# Patient Record
Sex: FEMALE | Race: WHITE | URBAN - METROPOLITAN AREA
[De-identification: names, ages, dates, MRNs, and addresses within clinical notes are randomized per-mention and may not be internally consistent; named-entity substitution may affect disease eponyms.]

---

## 2018-02-21 ENCOUNTER — PRENATAL OFFICE VISIT (OUTPATIENT)
Dept: OBGYN | Facility: CLINIC | Age: 23
End: 2018-02-21
Payer: MEDICAID

## 2018-02-21 ENCOUNTER — CARE COORDINATION (OUTPATIENT)
Dept: CARE COORDINATION | Facility: CLINIC | Age: 23
End: 2018-02-21

## 2018-02-21 VITALS
TEMPERATURE: 98.1 F | WEIGHT: 113.4 LBS | SYSTOLIC BLOOD PRESSURE: 121 MMHG | BODY MASS INDEX: 20.09 KG/M2 | DIASTOLIC BLOOD PRESSURE: 64 MMHG | HEIGHT: 63 IN | HEART RATE: 109 BPM

## 2018-02-21 DIAGNOSIS — Z34.80 PRENATAL CARE, SUBSEQUENT PREGNANCY: Primary | ICD-10-CM

## 2018-02-21 DIAGNOSIS — Z34.81 PRENATAL CARE, SUBSEQUENT PREGNANCY IN FIRST TRIMESTER: Primary | ICD-10-CM

## 2018-02-21 LAB
ABO + RH BLD: NORMAL
ABO + RH BLD: NORMAL
ALBUMIN UR-MCNC: NEGATIVE MG/DL
APPEARANCE UR: CLEAR
BILIRUB UR QL STRIP: NEGATIVE
BLD GP AB SCN SERPL QL: NORMAL
BLOOD BANK CMNT PATIENT-IMP: NORMAL
COLOR UR AUTO: YELLOW
ERYTHROCYTE [DISTWIDTH] IN BLOOD BY AUTOMATED COUNT: 13.6 % (ref 10–15)
GLUCOSE UR STRIP-MCNC: NEGATIVE MG/DL
HCT VFR BLD AUTO: 37.9 % (ref 35–47)
HGB BLD-MCNC: 12.7 G/DL (ref 11.7–15.7)
HGB UR QL STRIP: NEGATIVE
KETONES UR STRIP-MCNC: NEGATIVE MG/DL
LEUKOCYTE ESTERASE UR QL STRIP: NEGATIVE
MCH RBC QN AUTO: 31.1 PG (ref 26.5–33)
MCHC RBC AUTO-ENTMCNC: 33.5 G/DL (ref 31.5–36.5)
MCV RBC AUTO: 93 FL (ref 78–100)
NITRATE UR QL: NEGATIVE
PH UR STRIP: 6 PH (ref 5–7)
PLATELET # BLD AUTO: 318 10E9/L (ref 150–450)
RBC # BLD AUTO: 4.08 10E12/L (ref 3.8–5.2)
SOURCE: NORMAL
SP GR UR STRIP: 1.02 (ref 1–1.03)
SPECIMEN EXP DATE BLD: NORMAL
UROBILINOGEN UR STRIP-ACNC: 0.2 EU/DL (ref 0.2–1)
WBC # BLD AUTO: 11.4 10E9/L (ref 4–11)

## 2018-02-21 PROCEDURE — 85027 COMPLETE CBC AUTOMATED: CPT | Performed by: OBSTETRICS & GYNECOLOGY

## 2018-02-21 PROCEDURE — 80307 DRUG TEST PRSMV CHEM ANLYZR: CPT | Performed by: OBSTETRICS & GYNECOLOGY

## 2018-02-21 PROCEDURE — 99207 ZZC NO CHARGE NURSE ONLY: CPT | Performed by: OBSTETRICS & GYNECOLOGY

## 2018-02-21 PROCEDURE — 81003 URINALYSIS AUTO W/O SCOPE: CPT | Performed by: OBSTETRICS & GYNECOLOGY

## 2018-02-21 PROCEDURE — 99207 ZZC FIRST OB VISIT: CPT | Performed by: OBSTETRICS & GYNECOLOGY

## 2018-02-21 PROCEDURE — 86762 RUBELLA ANTIBODY: CPT | Performed by: OBSTETRICS & GYNECOLOGY

## 2018-02-21 PROCEDURE — 86901 BLOOD TYPING SEROLOGIC RH(D): CPT | Performed by: OBSTETRICS & GYNECOLOGY

## 2018-02-21 PROCEDURE — 86900 BLOOD TYPING SEROLOGIC ABO: CPT | Performed by: OBSTETRICS & GYNECOLOGY

## 2018-02-21 PROCEDURE — 87591 N.GONORRHOEAE DNA AMP PROB: CPT | Performed by: OBSTETRICS & GYNECOLOGY

## 2018-02-21 PROCEDURE — 86850 RBC ANTIBODY SCREEN: CPT | Performed by: OBSTETRICS & GYNECOLOGY

## 2018-02-21 PROCEDURE — 87491 CHLMYD TRACH DNA AMP PROBE: CPT | Performed by: OBSTETRICS & GYNECOLOGY

## 2018-02-21 PROCEDURE — 86780 TREPONEMA PALLIDUM: CPT | Performed by: OBSTETRICS & GYNECOLOGY

## 2018-02-21 PROCEDURE — G0145 SCR C/V CYTO,THINLAYER,RESCR: HCPCS | Performed by: OBSTETRICS & GYNECOLOGY

## 2018-02-21 PROCEDURE — 87389 HIV-1 AG W/HIV-1&-2 AB AG IA: CPT | Performed by: OBSTETRICS & GYNECOLOGY

## 2018-02-21 PROCEDURE — 36415 COLL VENOUS BLD VENIPUNCTURE: CPT | Performed by: OBSTETRICS & GYNECOLOGY

## 2018-02-21 PROCEDURE — 87340 HEPATITIS B SURFACE AG IA: CPT | Performed by: OBSTETRICS & GYNECOLOGY

## 2018-02-21 PROCEDURE — 87086 URINE CULTURE/COLONY COUNT: CPT | Performed by: OBSTETRICS & GYNECOLOGY

## 2018-02-21 RX ORDER — LORAZEPAM 0.5 MG/1
0.5 TABLET ORAL EVERY 6 HOURS PRN
COMMUNITY

## 2018-02-21 NOTE — PROGRESS NOTES
Marga Enriquez  is a 23 year old  year old single  G 6 P 2032who presents to the clinic for an new ob visit.    Estimated Date of Delivery: Sep 17, 2018  is calculated from Patient's last menstrual period was Patient's last menstrual period was 12/11/2017.   She has had some spotting - light Carlos was the last- after IC.   She has not had nausea. Weigh loss has not occurred.   This was not a planned pregnancy.   FOB is involved,     OTHER CONCERNS: homeless, living in her car  INFECTION HISTORY  HIV: no  Hepatitis B: no  Hepatitis C: no  Syphilis:  no  Tuberculosis: no   PPD- no  Herpes self: no  Herpes partner:  no  Chlamydia:  no  Gonorrhea:  no  HPV: no  BV:  no  Trichomonis:  no  Chicken Pox:  YES  ====================================================  PERSONAL/SOCIAL HISTORY  Lives alone.  Employment: Unemployed.  Living out of her car.  The FOB is in senior care awaiting a [facility] sentence  .  Additional items: her other 2 children , a girl and a boy live in [place] with their father  =====================================================   REVIEW OF SYSTEMS  CONSTITUTIONAL: NEGATIVE for fever, chills  INTEGUMENTARY/SKIN: NEGATIVE for worrisome rashes, moles or lesions  EYES: NEGATIVE for vision changes   ENT/MOUTH: NEGATIVE for ear, mouth and throat problems  RESP: NEGATIVE for significant cough or SOB  BREAST: NEGATIVE for masses, tenderness or discharge  CV: NEGATIVE for chest pain, palpitations   GI: NEGATIVE for nausea, abdominal pain, heartburn, or change in bowel habits  : NEGATIVE for frequency, dysuria, or hematuria  MUSCULOSKELETAL: NEGATIVE for significant arthralgias or myalgia  NEURO: NEGATIVE for weakness, dizziness or paresthesias or headache  ENDOCRINE: NEGATIVE for temperature intolerance, skin/hair changes  HEME: NEGATIVE for bleeding problems  PSYCHIATRIC: NEGATIVE for changes in mood or affect  C: NEGATIVE for fever, chills  E: NEGATIVE for vision changes   R: NEGATIVE for significant cough or  "SOB  M: NEGATIVE for significant arthralgias or myalgia  N: NEGATIVE for weakness, dizziness or paresthesias or headache  ====================================================  PHYSICAL EXAM: Vitals: /64  Pulse 109  Temp 98.1  F (36.7  C)  Ht 5' 3\" (1.6 m)  Wt 113 lb 6.4 oz (51.4 kg)  LMP 12/11/2017  BMI 20.09 kg/m2  BMI= Body mass index is 20.09 kg/(m^2).      RECOMMENDED WEIGHT GAIN: < 15 lbs.  GENERAL:  Pleasant pregnant female, alert, well groomed.  SKIN:  Warm and dry, without lesions or rashes  HEAD: Symmetrical features.  EYES:  PERRLA,   MOUTH:  Buccal mucosa pink, moist without lesions.    NECK:  Thyroid without enlargement and nodules.  Lymph nodes not palpable.   LUNGS:  Clear to auscultation.  BREAST:  Symmetrical.  No dominant, fixed or suspicious masses are noted.  No skin or nipple changes or axillary nodes.  Self exam is taught and encouraged.  Nipples everted.      HEART:  RRR without murmur.  ABDOMEN: Soft without masses , tenderness or organomegaly.  No CVA tenderness. No scars noted..   MUSCULOSKELETAL:  Full range of motion  EXTREMITIES:  No edema. No significant varicosities.   GENITALIA:  BUS WNL, no lesions noted   VAGINA:  Pink, normal rugae and discharge normal and physiologic,   CERVIX:  smooth, without discharge or CMT and parous os,   firm/ closed 4 cm long.  UTERUS: Anteverted, nontender 10 weeks in size.  ADNEXA: Without masses or tenderness  PELVIS:  Arch; wide . Sacrum; deep. Spines;blunt.  Side walls; straight. Type; gynecoid  PELVIS:   Adequate, Pelvis proven to 5 pounds 14 ounces.  RECTAL:  Normal appearance.  Digital exam deferred.  WET PREP:Not done  gonorrhea  =========================================  ASSESSMENT:  (Z34.81) Prenatal care, subsequent pregnancy in first trimester  (primary encounter diagnosis)  Comment: poor social situation  Estimated Date of Delivery: Sep 17, 2018   Plan: ABO/Rh type and screen, Anti Treponema, CBC         with platelets, " Neisseria gonorrhoeae PCR,         Chlamydia trachomatis PCR, Hepatitis B surface         antigen, HIV Antigen Antibody Combo, Rubella         Antibody IgG Quantitative, *UA reflex to         Microscopic, Urine Culture Aerobic Bacterial,         Drug abuse screen 77 urine (FL, RH, SH), Pap         imaged thin layer screen reflex to HPV if ASCUS        - recommend age 25 - 29        Care coordination referral made      PREGNANCY AT RISK? yes  ==========================================      PLAN:  Discussed physician coverage, tertiary support, diet, exercise, weight gain, schedule of visits, routine and indicated ultrasounds, childbirth education and antepartum testing for certain birth defects.  Encouraged patient to review contents of Prenatal Breastfeeding Education Toolkit. Offered opportunity to answer questions regarding the importance of skin to skin contact, early initiation of exclusive breastfeeding for the first six months and rooming in while in the hospital.  Discussed CDC travel advisory for Zika virus.  Syphilis is a sexually transmitted disease that can cause birth defects in the babies of untreated mothers. Every pregnant patient is tested for syphilis early in each pregnancy as part of the routine lab work. The [place] Department of [facility] has seen an increase in the rate of syphilis in [place]. The [facility] now recommends testing for syphilis 3 times during a pregnancy, the new prenatal visit, 28 weeks and when admitted for delivery    Instructed on use of triage nurse line and contacting the on call Birthplace after hours for an urgent need such as fever, vagina bleeding, bladder or vaginal infection, rupture of membranes,  or term labor.    Discussed the indications, uses for and false positives for quad screen, nuchal translucency and fetal survey ultrasound at 18-20 weeks gestation. Will inform us at the next visit if she wished to avail herself of these screens.  Instructed on best  evidence for: weight gain for her BMI for pregnancy; healthy diet and foods to avoid; exercise and activity during pregnancy;avoiding exposure to toxoplasmosis; and maintenance of a generally healthy lifestyle.   Discussed the harms, benefits, side effects and alternative therapies for current prescribed and OTC medications.

## 2018-02-21 NOTE — LETTER
February 25, 2018      Marga Enriquez  [address]  [place] [address]    Dear Ms.Floydkarissa,      I am happy to inform you that your recent cervical cancer screening test (PAP smear) was normal.      Preventative screenings such as this help to ensure your health for years to come. You should repeat a pap smear in 3 years, unless otherwise directed.      You will still need to return to the clinic every year for your annual exam and other preventive tests.     Please contact the clinic at [phone] if you have further questions.       Sincerely,      Destin Ceja MD/marcelo

## 2018-02-21 NOTE — MR AVS SNAPSHOT
"              After Visit Summary   2018    Marga Enriquez    MRN: [id number]           Patient Information     Date Of Birth          1995        Visit Information        Provider Department      2018 1:15 PM Destin Ceja MD [facility]        Today's Diagnoses     Prenatal care, subsequent pregnancy in first trimester    -  1     labor in second trimester with term delivery, single or unspecified fetus           Follow-ups after your visit        Follow-up notes from your care team     Return in about 4 weeks (around 3/21/2018).      Your next 10 appointments already scheduled     Mar 21, 2018 10:45 AM CDT   ESTABLISHED PRENATAL with Destin Ceja MD   [facility]    [address] [address] [phone]              Who to contact     If you have questions or need follow up information about today's clinic visit or your schedule please contact [facility] directly at [phone].  Normal or non-critical lab and imaging results will be communicated to you by Cloud[employer], letter or phone within 4 business days after the clinic has received the results. If you do not hear from us within 7 days, please contact the clinic through [employer] or phone. If you have a critical or abnormal lab result, we will notify you by phone as soon as possible.  Submit refill requests through [employer] or call your pharmacy and they will forward the refill request to us. Please allow 3 business days for your refill to be completed.          Additional Information About Your Visit        CloudCarhart Information     [employer] lets you send messages to your doctor, view your test results, renew your prescriptions, schedule appointments and more. To sign up, go to [email or web address] . Click on \"Log in\" on the left side of the screen, which will take you to the Welcome page. Then click on \"Sign up Now\" on the right side of " "the page.     You will be asked to enter the access code listed below, as well as some personal information. Please follow the directions to create your username and password.     Your access code is: [email or web address]  Expires: 2018 11:36 AM     Your access code will  in 90 days. If you need help or a new code, please call your [place] clinic or [phone].        Care EveryWhere ID     This is your Care EveryWhere ID. This could be used by other organizations to access your [place] medical records  [id number]        Your Vitals Were     Pulse Temperature Height Last Period BMI (Body Mass Index)       109 98.1  F (36.7  C) 5' 3\" (1.6 m) 2017 20.09 kg/m2        Blood Pressure from Last 3 Encounters:   18 121/64    Weight from Last 3 Encounters:   18 113 lb 6.4 oz (51.4 kg)              We Performed the Following     *UA reflex to Microscopic     ABO/Rh type and screen     Anti Treponema     CBC with platelets     Chlamydia trachomatis PCR     Drug abuse screen 77 urine (FL, RH, SH)     Hepatitis B surface antigen     HIV Antigen Antibody Combo     Neisseria gonorrhoeae PCR     Pap imaged thin layer screen reflex to HPV if ASCUS - recommend age 25 - 29     Rubella Antibody IgG Quantitative     Urine Culture Aerobic Bacterial        Primary Care Provider Fax #    Physician No Ref-Primary [phone]       No address on file        Equal Access to Services     JASON LUCERO : Hadii aad ku hadasho Soomaali, waaxda luqadaha, qaybta kaalmada cristina, ke heaton . So [facility] [phone].    ATENCIÓN: Si habla español, tiene a abdalla disposición servicios gratuitos de asistencia lingüística. Zechariah al [phone].    We comply with applicable federal civil rights laws and [place] laws. We do not discriminate on the basis of race, color, national origin, age, disability, sex, sexual orientation, or gender identity.            Thank you!     Thank you for choosing " [facility]  for your care. Our goal is always to provide you with excellent care. Hearing back from our patients is one way we can continue to improve our services. Please take a few minutes to complete the written survey that you may receive in the mail after your visit with us. Thank you!             Your Updated Medication List - Protect others around you: Learn how to safely use, store and throw away your medicines at www.disposemymeds.org.          This list is accurate as of 2/21/18 11:59 PM.  Always use your most recent med list.                   Brand Name Dispense Instructions for use Diagnosis    LORazepam 0.5 MG tablet    ATIVAN     Take 0.5 mg by mouth every 6 hours as needed for anxiety

## 2018-02-21 NOTE — PROGRESS NOTES
"Clinic Care Coordination Contact 2/21/18  [facility]/[employer]-       Clinical Data: Care Coordinator Outreach to assist the pt with resources and support as she is homeless, and pregnant.  Outreach attempted x 1.  Left message on [employer] with call back information and requested return call.  Plan:  Care Coordinator will try to reach patient again in 1-2 business days.    UPDATE: Pt contacted me from her clinic apt. She states she moved to [place] from [place] to be close to the [place] in [place] as she was having some kidney trouble. She moved in with her aunt who lives in [place]. While she was there, she was looking for housing on [employer]'s list and met a man. She began dating him and is pregnant with his child. He was arrested on a warrant and is now in care home waiting for his [facility] sentence. The pt has two other small children, ages 2 and 4 who live with their father in [place]. Pt states she moved to [facility] to be close to her boyfriend while he's in care home so she is currently living out of her car.     I provided resources to the pt to assist her with obtaining insurance, housing, financial support, etc. I directed her to contacting \"A place for you\" and [employer] which are transitional housing programs. I also provided her with the phone number for [facility] [phone]. I encouraged the pt to reach out to me if she does not find housing and we can continue to search other options.    UPDATE: Phone call made to the pt to see if she had made any calls yet. She states she was at the [facility] family services office filling out paperwork. I encouraged her to speak with someone there to obtain additional housing resources. Pt agrees to call this writer should she need more resources.     Kellie Ivy, [facility]  Care Coordinator Social Work    [facility]  [phone]  2/21/2018 11:11 AM            "

## 2018-02-21 NOTE — NURSING NOTE
"Initial /64  Pulse 109  Temp 98.1  F (36.7  C)  Ht 5' 3\" (1.6 m)  Wt 113 lb 6.4 oz (51.4 kg)  LMP 12/11/2017  BMI 20.09 kg/m2 Estimated body mass index is 20.09 kg/(m^2) as calculated from the following:    Height as of this encounter: 5' 3\" (1.6 m).    Weight as of this encounter: 113 lb 6.4 oz (51.4 kg). .      "

## 2018-02-21 NOTE — MR AVS SNAPSHOT
"              After Visit Summary   2/21/2018    Marga Enriquez    MRN: [id number]           Patient Information     Date Of Birth          1995        Visit Information        Provider Department      2/21/2018 11:11 AM Destin Ceja MD [facility]        Today's Diagnoses     Prenatal care, subsequent pregnancy    -  1       Follow-ups after your visit        Your next 10 appointments already scheduled     Feb 21, 2018  1:15 PM CST   New Prenatal with Destin Ceja MD   [facility] ([facility])    [address] [address]   [phone]              Who to contact     If you have questions or need follow up information about today's clinic visit or your schedule please contact [facility] directly at [phone].  Normal or non-critical lab and imaging results will be communicated to you by Spoolhart, letter or phone within 4 business days after the clinic has received the results. If you do not hear from us within 7 days, please contact the clinic through MyChart or phone. If you have a critical or abnormal lab result, we will notify you by phone as soon as possible.  Submit refill requests through "[employer] or call your pharmacy and they will forward the refill request to us. Please allow 3 business days for your refill to be completed.          Additional Information About Your Visit        MyChart Information     "[employer] lets you send messages to your doctor, view your test results, renew your prescriptions, schedule appointments and more. To sign up, go to [email or web address] . Click on \"Log in\" on the left side of the screen, which will take you to the Welcome page. Then click on \"Sign up Now\" on the right side of the page.     You will be asked to enter the access code listed below, as well as some personal information. Please follow the directions to create your username and password.     Your access code is: " [email or web address]  Expires: 2018 11:36 AM     Your access code will  in 90 days. If you need help or a new code, please call your [place] clinic or [phone].        Care EveryWhere ID     This is your Care EveryWhere ID. This could be used by other organizations to access your [place] medical records  [id number]        Your Vitals Were     Last Period                   2017            Blood Pressure from Last 3 Encounters:   No data found for BP    Weight from Last 3 Encounters:   No data found for Wt              Today, you had the following     No orders found for display       Primary Care Provider    None Specified       No primary provider on file.        Equal Access to Services     [facility]: Hadii ismael Lezama, gayle maldonado, treva evans, ke heaton . So [facility] [phone].    ATENCIÓN: Si habla español, tiene a abdalla disposición servicios gratuitos de asistencia lingüística. Llame al [phone].    We comply with applicable federal civil rights laws and [place] laws. We do not discriminate on the basis of race, color, national origin, age, disability, sex, sexual orientation, or gender identity.            Thank you!     Thank you for choosing [facility]  for your care. Our goal is always to provide you with excellent care. Hearing back from our patients is one way we can continue to improve our services. Please take a few minutes to complete the written survey that you may receive in the mail after your visit with us. Thank you!             Your Updated Medication List - Protect others around you: Learn how to safely use, store and throw away your medicines at www.disposemymeds.org.          This list is accurate as of 18 11:36 AM.  Always use your most recent med list.                   Brand Name Dispense Instructions for use Diagnosis    LORazepam 0.5 MG tablet    ATIVAN     Take 0.5 mg by mouth every 6  hours as needed for anxiety

## 2018-02-22 LAB
AMPHETAMINES UR QL SCN: NEGATIVE
BARBITURATES UR QL: NEGATIVE
BENZODIAZ UR QL: NEGATIVE
C TRACH DNA SPEC QL NAA+PROBE: NEGATIVE
CANNABINOIDS UR QL SCN: NEGATIVE
COCAINE UR QL: NEGATIVE
HBV SURFACE AG SERPL QL IA: NONREACTIVE
HIV 1+2 AB+HIV1 P24 AG SERPL QL IA: NONREACTIVE
N GONORRHOEA DNA SPEC QL NAA+PROBE: NEGATIVE
OPIATES UR QL SCN: NEGATIVE
PCP UR QL SCN: NEGATIVE
RUBV IGG SERPL IA-ACNC: 12 IU/ML
SPECIMEN SOURCE: NORMAL
SPECIMEN SOURCE: NORMAL
T PALLIDUM IGG+IGM SER QL: NEGATIVE

## 2018-02-23 LAB
BACTERIA SPEC CULT: NORMAL
Lab: NORMAL
SPECIMEN SOURCE: NORMAL

## 2018-02-24 LAB
COPATH REPORT: NORMAL
PAP: NORMAL

## 2018-02-28 ENCOUNTER — APPOINTMENT (OUTPATIENT)
Dept: OCCUPATIONAL MEDICINE | Facility: CLINIC | Age: 23
End: 2018-02-28

## 2018-02-28 PROCEDURE — 86580 TB INTRADERMAL TEST: CPT | Performed by: NURSE PRACTITIONER

## 2018-03-14 ENCOUNTER — HOSPITAL ENCOUNTER (OUTPATIENT)
Facility: CLINIC | Age: 23
End: 2018-03-14
Admitting: OBSTETRICS & GYNECOLOGY
Payer: MEDICAID

## 2018-04-03 ENCOUNTER — PRENATAL OFFICE VISIT (OUTPATIENT)
Dept: OBGYN | Facility: CLINIC | Age: 23
End: 2018-04-03
Payer: MEDICAID

## 2018-04-03 VITALS
TEMPERATURE: 96.2 F | RESPIRATION RATE: 16 BRPM | BODY MASS INDEX: 21.62 KG/M2 | SYSTOLIC BLOOD PRESSURE: 125 MMHG | HEIGHT: 63 IN | DIASTOLIC BLOOD PRESSURE: 72 MMHG | WEIGHT: 122 LBS | HEART RATE: 114 BPM

## 2018-04-03 DIAGNOSIS — Z3A.16 16 WEEKS GESTATION OF PREGNANCY: Primary | ICD-10-CM

## 2018-04-03 DIAGNOSIS — F32.89 OTHER DEPRESSION: ICD-10-CM

## 2018-04-03 PROCEDURE — 99207 ZZC PRENATAL VISIT: CPT | Performed by: OBSTETRICS & GYNECOLOGY

## 2018-04-03 NOTE — NURSING NOTE
"Initial /72 (BP Location: Right arm, Patient Position: Chair, Cuff Size: Adult Small)  Pulse 114  Temp 96.2  F (35.7  C) (Tympanic)  Resp 16  Ht 5' 3\" (1.6 m)  Wt 122 lb (55.3 kg)  LMP 12/11/2017  BMI 21.61 kg/m2 Estimated body mass index is 21.61 kg/(m^2) as calculated from the following:    Height as of this encounter: 5' 3\" (1.6 m).    Weight as of this encounter: 122 lb (55.3 kg). .      "

## 2018-04-03 NOTE — PROGRESS NOTES
"CC: Here for routine prenatal visit @ 16w1d   HPI:  Admits to feeling overwhelmed and depressed; no suicidal ideation; previously on Lorazepam and Fluoxetine; agreed to resume fluoxetine and check in after about 2 weeks    PE: /72 (BP Location: Right arm, Patient Position: Chair, Cuff Size: Adult Small)  Pulse 114  Temp 96.2  F (35.7  C) (Tympanic)  Resp 16  Ht 5' 3\" (1.6 m)  Wt 122 lb (55.3 kg)  LMP 12/11/2017  BMI 21.61 kg/m2   See OB flowsheet      A:  1. 16 weeks gestation of pregnancy    - US OB > 14 Weeks Complete Single; Future    2. Other depression    - FLUoxetine (PROZAC) 20 MG capsule; Take 1 capsule (20 mg) by mouth daily  Dispense: 90 capsule; Refill: 3      Routine prenatal care  20 week anomaly screen sonogram  Fluoxetine 20mg po QD  RTC 4 weeks.      Katelynn Newell M.D.     "

## 2018-04-03 NOTE — MR AVS SNAPSHOT
"              After Visit Summary   4/3/2018    Marga Enriquez    MRN: [id number]           Patient Information     Date Of Birth          1995        Visit Information        Provider Department      4/3/2018 1:00 PM Katelynn Newell MD [facility]        Today's Diagnoses     16 weeks gestation of pregnancy    -  1    Other depression           Follow-ups after your visit        Future tests that were ordered for you today     Open Future Orders        Priority Expected Expires Ordered    US OB > 14 Weeks Complete Single Routine  4/3/2019 4/3/2018            Who to contact     If you have questions or need follow up information about today's clinic visit or your schedule please contact [facility] directly at [phone].  Normal or non-critical lab and imaging results will be communicated to you by MyChart, letter or phone within 4 business days after the clinic has received the results. If you do not hear from us within 7 days, please contact the clinic through [employer] or phone. If you have a critical or abnormal lab result, we will notify you by phone as soon as possible.  Submit refill requests through [employer] or call your pharmacy and they will forward the refill request to us. Please allow 3 business days for your refill to be completed.          Additional Information About Your Visit        MyChart Information     [employer] lets you send messages to your doctor, view your test results, renew your prescriptions, schedule appointments and more. To sign up, go to [email or web address] . Click on \"Log in\" on the left side of the screen, which will take you to the Welcome page. Then click on \"Sign up Now\" on the right side of the page.     You will be asked to enter the access code listed below, as well as some personal information. Please follow the directions to create your username and password.     Your access code is: [email or web address]  Expires: 5/22/2018 12:36 PM   " "  Your access code will  in 90 days. If you need help or a new code, please call your [place] [facility] clinic or [phone].        Care EveryWhere ID     This is your Care EveryWhere ID. This could be used by other organizations to access your [facility] medical records  [id number]        Your Vitals Were     Pulse Temperature Respirations Height Last Period BMI (Body Mass Index)    114 96.2  F (35.7  C) (Tympanic) 16 5' 3\" (1.6 m) 2017 21.61 kg/m2       Blood Pressure from Last 3 Encounters:   18 125/72   18 121/64    Weight from Last 3 Encounters:   18 122 lb (55.3 kg)   18 113 lb 6.4 oz (51.4 kg)                 Today's Medication Changes          These changes are accurate as of 4/3/18  1:27 PM.  If you have any questions, ask your nurse or doctor.               Start taking these medicines.        Dose/Directions    FLUoxetine 20 MG capsule   Commonly known as:  PROzac   Used for:  Other depression   Started by:  Katelynn Newell MD        Dose:  20 mg   Take 1 capsule (20 mg) by mouth daily   Quantity:  90 capsule   Refills:  3            Where to get your medicines      These medications were sent to [facility] [address] [place] [address]     Phone:  [phone]     FLUoxetine 20 MG capsule                Primary Care Provider Fax #    Physician No Ref-Primary [phone]       No address on file        Equal Access to Services     JASON LUCERO AH: Hadii ismael corbino Sotiffanyali, waaxda luqadaha, qaybta kaalmada brightegqi, ke mccauley. So [facility] [phone].    ATENCIÓN: Si habla manuelaañol, tiene a abdalla disposición servicios gratuitos de asistencia lingüística. Llame al [phone].    We comply with applicable federal civil rights laws and [place] laws. We do not discriminate on the basis of race, color, national origin, age, disability, sex, sexual orientation, or gender identity.       "      Thank you!     Thank you for choosing [facility]  for your care. Our goal is always to provide you with excellent care. Hearing back from our patients is one way we can continue to improve our services. Please take a few minutes to complete the written survey that you may receive in the mail after your visit with us. Thank you!             Your Updated Medication List - Protect others around you: Learn how to safely use, store and throw away your medicines at www.disposemymeds.org.          This list is accurate as of 4/3/18  1:27 PM.  Always use your most recent med list.                   Brand Name Dispense Instructions for use Diagnosis    FLUoxetine 20 MG capsule    PROzac    90 capsule    Take 1 capsule (20 mg) by mouth daily    Other depression       LORazepam 0.5 MG tablet    ATIVAN     Take 0.5 mg by mouth every 6 hours as needed for anxiety

## 2018-04-05 ENCOUNTER — HOSPITAL ENCOUNTER (EMERGENCY)
Facility: CLINIC | Age: 23
Discharge: HOME | End: 2018-04-05
Attending: FAMILY MEDICINE | Admitting: FAMILY MEDICINE
Payer: MEDICAID

## 2018-04-05 VITALS
OXYGEN SATURATION: 100 % | RESPIRATION RATE: 16 BRPM | DIASTOLIC BLOOD PRESSURE: 60 MMHG | HEART RATE: 107 BPM | SYSTOLIC BLOOD PRESSURE: 110 MMHG | WEIGHT: 122 LBS | BODY MASS INDEX: 21.61 KG/M2 | TEMPERATURE: 96.4 F

## 2018-04-05 DIAGNOSIS — N94.9 ROUND LIGAMENT PAIN: ICD-10-CM

## 2018-04-05 PROCEDURE — 99283 EMERGENCY DEPT VISIT LOW MDM: CPT | Mod: Z6 | Performed by: FAMILY MEDICINE

## 2018-04-05 PROCEDURE — 99283 EMERGENCY DEPT VISIT LOW MDM: CPT

## 2018-04-05 NOTE — ED AVS SNAPSHOT
[facility] Emergency Department    [address] [address]    Phone:  [phone]    Fax:  [phone]                                       Marga Enriquez   MRN: [id number]    Department:  [facility] Emergency Department   Date of Visit:  4/5/2018           Patient Information     Date Of Birth          1995        Your diagnoses for this visit were:     Round ligament pain        You were seen by Elvin Marr MD.        Discharge Instructions       Return to the Emergency Room if the following occurs:     Worsened pain, fever >101, severe vaginal bleeding, fainting, or for any concern at anytime.    Or, follow-up with the following provider as we discussed:     Return to your primary doctor as needed, or if not improved over the next 5 days.    Medications discussed:    Tylenol for comfort, as needed.    If you received pain-relieving or sedating medication during your time in the ER, avoid alcohol, driving automobiles, or working with machinery.  Also, a responsible adult must stay with you.        Call the Nurse Advice Line at [phone] or [phone] for any concern at anytime.      Your next 10 appointments already scheduled     May 02, 2018 10:00 AM CDT   (Arrive by 9:45 AM)   US OB > 14 WEEKS COMPLETE SINGLE with WYUS1   [facility] Ultrasound ([facility])    [address] [address] [phone]           Please bring a list of your medicines (including vitamins, minerals and over-the-counter drugs). Also, tell your doctor about any allergies you may have. Wear comfortable clothes and leave your valuables at home.  If you re less than 20 weeks drink four 8-ounce glasses of fluid an hour before your exam. If you need to empty your bladder before your exam, try to release only a little urine. Then, drink another glass of fluid.  You may have up to two family members in the exam room. If you bring a small child, an adult  must be there to care for him or her.  Please call the Imaging Department at your exam site with any questions.              24 Hour Appointment Hotline       To make an appointment at any [facility], call [phone] ([phone]). If you don't have a family doctor or clinic, we will help you find one. [facility] clinics are conveniently located to serve the needs of you and your family.             Review of your medicines      Our records show that you are taking the medicines listed below. If these are incorrect, please call your family doctor or clinic.        Dose / Directions Last dose taken    FLUoxetine 20 MG capsule   Commonly known as:  PROzac   Dose:  20 mg   Quantity:  90 capsule        Take 1 capsule (20 mg) by mouth daily   Refills:  3        LORazepam 0.5 MG tablet   Commonly known as:  ATIVAN   Dose:  0.5 mg        Take 0.5 mg by mouth every 6 hours as needed for anxiety   Refills:  0                Procedures and tests performed during your visit     POC US OB TRANSABDOMINAL LIMITED      Orders Needing Specimen Collection     None      Pending Results     No orders found from 4/3/2018 to 4/6/2018.            Pending Culture Results     No orders found from 4/3/2018 to 4/6/2018.            Pending Results Instructions     If you had any lab results that were not finalized at the time of your Discharge, you can call the ED Lab Result RN at [phone]. You will be contacted by this team for any positive Lab results or changes in treatment. The nurses are available 7 days a week from 10A to 6:30P.  You can leave a message 24 hours per day and they will return your call.        Test Results From Your Hospital Stay        4/5/2018  8:10 PM      Impression     Point-of-care ultrasound performed by me.  Trans-abdominal ultrasound using the low-frequency probe.  Baby is easily identified within the uterus.  There is appropriate movement.  A heart rate within normal range is identified.  Uterus is  "grossly intact and without obvious pathology.  No obvious evidence for hematoma.  Overall normal appearing OB transabdominal ultrasound.                Thank you for choosing [facility]       Thank you for choosing [facility] for your care. Our goal is always to provide you with excellent care. Hearing back from our patients is one way we can continue to improve our services. Please take a few minutes to complete the written survey that you may receive in the mail after you visit with us. Thank you!        [employer] Information     [employer] lets you send messages to your doctor, view your test results, renew your prescriptions, schedule appointments and more. To sign up, go to [email or web address] . Click on \"Log in\" on the left side of the screen, which will take you to the Welcome page. Then click on \"Sign up Now\" on the right side of the page.     You will be asked to enter the access code listed below, as well as some personal information. Please follow the directions to create your username and password.     Your access code is: [email or web address]  Expires: 2018 12:36 PM     Your access code will  in 90 days. If you need help or a new code, please call your [facility] clinic or [phone].        Care EveryWhere ID     This is your Care EveryWhere ID. This could be used by other organizations to access your [facility] medical records  [id number]        Equal Access to Services     JASON LUCERO : Hadii ismael yoon hadpradipo Sotiffanyali, waaxda luqadaha, qaybta kaalmada cristina, ke heaton . So [facility] [phone].    ATENCIÓN: Si habla español, tiene a abdalla disposición servicios gratuitos de asistencia lingüística. Llame al [phone].    We comply with applicable federal civil rights laws and [place] laws. We do not discriminate on the basis of race, color, national origin, age, disability, sex, sexual orientation, or gender identity.            After Visit Summary       This is your " record. Keep this with you and show to your community pharmacist(s) and doctor(s) at your next visit.

## 2018-04-05 NOTE — ED AVS SNAPSHOT
[facility] Emergency Department    [address] [address]    Phone:  [phone]    Fax:  [phone]                                       Marga Enriquez   MRN: [id number]    Department:  [facility] Emergency Department   Date of Visit:  4/5/2018           After Visit Summary Signature Page     I have received my discharge instructions, and my questions have been answered. I have discussed any challenges I see with this plan with the nurse or doctor.    ..........................................................................................................................................  Patient/Patient Representative Signature      ..........................................................................................................................................  Patient Representative Print Name and Relationship to Patient    ..................................................               ................................................  Date                                            Time    ..........................................................................................................................................  Reviewed by Signature/Title    ...................................................              ..............................................  Date                                                            Time

## 2018-04-05 NOTE — LETTER
April 5, 2018      To Whom It May Concern:      Marga Enriquez was seen in our Emergency Department today, 04/05/18.  I expect her condition to improve over the next 5 days.  She may return to work/school tomorrow but I would recommend she not lift, push, pull anything greater than 30 pounds over the next 5 days.    Sincerely,        Elvin Marr MD

## 2018-04-06 ENCOUNTER — PATIENT OUTREACH (OUTPATIENT)
Dept: CARE COORDINATION | Facility: CLINIC | Age: 23
End: 2018-04-06

## 2018-04-06 NOTE — LETTER
Health Care Home - Access Care Plan    About Me  Patient Name:  Marga Enriquez    YOB: 1995  Age:                             23 year old   Khadijah MRN:            [id number] Telephone Information:     Home Phone [phone]   Mobile [phone]       Address:    [address] [facility] [address] [address] Email address:  No e-mail address on record      Emergency Contact(s)  Name Relationship Lgl Grd Work Phone Home Phone Mobile Phone   1. NONE Other   [phone]              Health Maintenance:      My Access Plan  Medical Emergency 911   Questions or concerns during clinic hours Primary Clinic Line, I will call the clinic directly: [facility] - [phone]   24 Hour Appointment Line [phone] or  [phone]) (toll free)   24 Hour Nurse Line [phone] (toll free)   Questions or concerns outside clinic hours 24 Hour Appointment Line, I will call the after-hours on-call line:   [facility] [phone] or [phone]) (toll-free)   Preferred Urgent Care [facility], [phone]   Preferred Hospital [place] [place]  [phone]   Preferred Pharmacy [facility] - [address]     Behavioral Health Crisis Line The National Suicide Prevention Lifeline at 1-132.752.3241 or 911     My Care Team Members  Patient Care Team       Relationship Specialty Notifications Start End    No Ref-Primary, Physician PCP - General   2/21/18     Fax: [phone]         Pati العراقي LSW Clinic Care Coordinator Primary Care - CC Admissions 2/26/18     Phone: [phone] Fax: [phone]        Roberto Vance Therapist   3/21/18     Comment:  [facility]    Phone: [phone] Fax: [phone]               My Medical and Care Information  Problem List   Patient Active Problem List   Diagnosis     Prenatal care, subsequent pregnancy     Other depression       Current Medications and Allergies:  See printed Medication Report

## 2018-04-06 NOTE — LETTER
[facility] CARE COORDINATION  [address] [place] [address]  [phone]      April 6, 2018      Marga Enriquez  [address] [address]      Dear Marga,    I am a clinic care coordinator who works with the [facility] and I wanted to introduce myself and provide you with my contact information so that you can call me with questions or concerns about your health care. Below is a description of clinic care coordination and how I can further assist you.     The clinic care coordinator is a registered nurse and/or  who understand the health care system. The goal of clinic care coordination is to help you manage your health and improve access to the [facility] system in the most efficient manner. The registered nurse can assist you in meeting your health care goals by providing education, coordinating services, and strengthening the communication among your providers. The  can assist you with financial, behavioral, psychosocial, chemical dependency, counseling, and/or psychiatric resources.    Please feel free to contact me at [phone], with any questions or concerns. We at [facility] are focused on providing you with the highest-quality healthcare experience possible and that all starts with you.     Sincerely,     Pati العراقي    Enclosed: I have enclosed a copy of a 24 Hour Access Plan. This has helpful phone numbers for you to call when needed. Please keep this in an easy to access place to use as needed.

## 2018-04-06 NOTE — ED PROVIDER NOTES
HPI  Patient is a 23-year-old female presenting with abdominal pain.  She is known to be approximately 16 weeks gestation by first trimester ultrasound.  She is seen by OB here at [facility].  Distant history of methamphetamine abuse.  More recent history of miscarriage in the fall 2017 and then in the spring 2017.    The patient was working yesterday as usual.  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  She also describes shoveling last evening.  Immediately after doing this she began to feel some sharp/cramping pain in her lower abdomen, midline.  The pain lessened over the course of the evening but then returned early this morning at about 4:00 AM.  She describes the pain as sharp once again.  She does have some left-sided pelvic discomfort.  It is worse with movement and standing.  Today she was working again and had to help a client get up.  As she did so she had worsened pain.  There is been no nausea or vomiting.  No diarrhea or constipation.  No hematochezia or melena.  No dysuria, urgency, or frequency.  No hematuria.  No vaginal discharge or irritation.  No vaginal bleeding.  No chest pain or shortness of breath.  No skin rash.    ROS: All other review of systems are negative other than that noted above.     Past Medical History:   Diagnosis Date     Anxiety      Chickenpox      Depression      H/O urinary tract infection      Kidney infection     x2- 3/2017- 11/2017  states did get septic with both infections     Panic attacks      Postpartum depression     2014     History reviewed. No pertinent surgical history.  Family History   Problem Relation Age of Onset     Depression Father      Bipolar Disorder Father      No Known Problems Paternal Grandmother      Social History   Substance Use Topics     Smoking status: Current Some Day Smoker     Smokeless tobacco: Never Used      Comment: 2-3 cig/day with pregnancy     Alcohol use No         PHYSICAL  /60  Pulse 107   Temp 96.4  F (35.8  C) (Temporal)  Resp 16  Wt 55.3 kg (122 lb)  LMP 12/11/2017  SpO2 100%  BMI 21.61 kg/m2  General: Patient is alert and in mild to moderate distress.  Discern/anxious.  Neurological: Alert.  Moving upper and lower extremities equally, bilaterally.  Head / Neck: Atraumatic.  Ears: Not done.  Eyes: Pupils are equal, round, and reactive.  Normal conjunctiva.  Nose: Midline.  No epistaxis.  Mouth / Throat: No ulcerations or lesions.  Upper pharynx is not erythematous.  Moist.  Respiratory: No respiratory distress. CTA B.  Cardiovascular: Regular rhythm.  Peripheral extremities are warm.  No edema.  No calf tenderness.  Abdomen / Pelvis: Very minimal tenderness as I push in the pelvis and left lower quadrant.  Gravid.  Soft throughout.  Genitalia: Not done.  Musculoskeletal: No tenderness over major muscles and joints.  Skin: No evidence of rash or trauma.        ED COURSE  2008.  The patient has mechanical pelvic pain as described above.  She has very mild tenderness on examination.  Ultrasound is performed in the room and documented below.  No obvious abnormalities.  She is without associated symptoms.  I believe her pain is most likely related to round ligament pain or abdominal wall pain.  I did provide restrictions for lifting/pushing/pulling over the next 5 days.  Further restrictions will be provided by primary.  Tylenol recommended.  Return here for worsening as discussed per    IMAGING  Images reviewed by me.  Radiology report also reviewed.  POC US OB TRANSABDOMINAL LIMITED   Final Result   Point-of-care ultrasound performed by me.  Trans-abdominal ultrasound using the low-frequency probe.  Baby is easily identified within the uterus.  There is appropriate movement.  A heart rate within normal range is identified.  Uterus is grossly intact and without obvious pathology.  No obvious evidence for hematoma.  Overall normal appearing OB transabdominal ultrasound.        Medications - No data  to display      IMPRESSION    ICD-10-CM    1. Round ligament pain N94.9            Critical Care time:  none                    Elvin Marr MD  04/05/18 2010

## 2018-04-06 NOTE — ED NOTES
Pt presents to the ED with complaints of umbilical and down abdominal pain since shoveling and lifting heavy boxes yesterday (pain was not too bad until this evening). Pt has had pain like this before (on previous miscarriage).  P:2 Pain is worse with laying flat and with movement. Pt has NOT been nauseated and has NOT vomited. Unsure Last BM (1 week?, normal) No change in bowel or bladder. NO blood in urine or stool.

## 2018-04-06 NOTE — PROGRESS NOTES
Clinic Care Coordination Contact  [facility]/Voicemail    Referral Source: Specialist (OB MD team)--ED follow up due to abdominal pain.    Clinical Data: Care Coordinator Outreach    Outreach attempted x 1.  Left message on voicemail with call back information and requested return call.  Plan: Care Coordinator mailed out care coordination introduction letter on 4-6-18. Care Coordinator will try to reach patient again in 1-5 business days.    Pati Celeste  Social Work Care Coordinator  [facility]  [phone]

## 2018-04-06 NOTE — DISCHARGE INSTRUCTIONS
Return to the Emergency Room if the following occurs:     Worsened pain, fever >101, severe vaginal bleeding, fainting, or for any concern at anytime.    Or, follow-up with the following provider as we discussed:     Return to your primary doctor as needed, or if not improved over the next 5 days.    Medications discussed:    Tylenol for comfort, as needed.    If you received pain-relieving or sedating medication during your time in the ER, avoid alcohol, driving automobiles, or working with machinery.  Also, a responsible adult must stay with you.        Call the Nurse Advice Line at [phone] or [phone] for any concern at anytime.

## 2018-08-23 ENCOUNTER — PATIENT OUTREACH (OUTPATIENT)
Dept: CARE COORDINATION | Facility: CLINIC | Age: 23
End: 2018-08-23

## 2018-08-23 NOTE — PROGRESS NOTES
Clinic Care Coordination Contact  [facility]/Voicemail       Clinical Data: Care Coordinator Outreach    Outreach attempted x 1.  SW unable to leave a message as pt's phone is not working.  It appears as the pt has not engaged with [facility] since 4-2018 and was living in [place], [place] in May 2018 and receiving cares at the [facility].    Plan: Care Coordinator will do no further outreaches at this time.    Pati Celeste  Social Work Care Coordinator  [facility]  [phone]

## 2019-12-27 ENCOUNTER — AMBULATORY - HEALTHEAST (OUTPATIENT)
Dept: INTERNAL MEDICINE | Facility: CLINIC | Age: 24
End: 2019-12-27

## 2020-03-24 ENCOUNTER — VIRTUAL VISIT (OUTPATIENT)
Dept: FAMILY MEDICINE | Facility: OTHER | Age: 25
End: 2020-03-24

## 2020-03-25 NOTE — PROGRESS NOTES
"Date: 2020 23:16:00  Clinician: Ruth Ramon  Clinician NPI: [id number]  Patient: Marga Enriquez  Patient : 1995  Patient Address: [address] [place] [id number]  Patient Phone: [phone]  Visit Protocol: URI  Patient Summary:  Marga is a 25 year old ( : 1995 ) female who initiated a Visit for cold, sinus infection, or influenza. When asked the question \"Please sign me up to receive news, health information and promotions. \", Marga responded \"No\".    Marga states her symptoms started 1-2 days ago.   Her symptoms consist of malaise, ear pain, myalgia, wheezing, a sore throat, and a cough.   Symptom details     Cough: Marga coughs almost every minute and her cough is more bothersome at night. Phlegm does not come into her throat when she coughs. She does not believe her cough is caused by post-nasal drip.     Sore throat: Marga reports having moderate throat pain (4-6 on a 10 point pain scale), does not have exudate on her tonsils, and can swallow liquids. She is not sure if the lymph nodes in her neck are enlarged. A rash has not appeared on the skin since the sore throat started.     Wheezing: Marga has not ever been diagnosed with asthma. The wheezing does not interfere with her normal daily activities.     Marga denies having chills, rhinitis, teeth pain, headache, fever, facial pain or pressure, and nasal congestion. She also denies taking antibiotic medication for the symptoms and having recent facial or sinus surgery in the past 60 days.   Precipitating events  Marga is not sure if she has been exposed to someone with strep throat. She has not recently been exposed to someone with influenza. Marga has been in close contact with the following high risk individuals: children under the age of 5.   Pertinent COVID-19 (Coronavirus) information  Marga has not traveled internationally or to the areas where COVID-19 (Coronavirus) is widespread, including cruise ship travel in the " last 14 days before the start of her symptoms.   Marga has not had a close contact with a laboratory-confirmed COVID-19 patient within 14 days of symptom onset. She also has not had a close contact with a suspected COVID-19 patient within 14 days of symptom onset.   Marga is not a healthcare worker or a  and does not work in a healthcare facility. She is not a family member of a healthcare worker and does not live with someone who is a healthcare worker.   Triage Point(s) temporarily suspended for COVID-19 (Coronavirus) screening  Marga reported the following symptoms which were previously protocol referral points. These protocol referral points have temporarily been removed for purposes of COVID-19 (Coronavirus) screening.   Difficulty breathing even when resting and can only speak in phrase(s)   Pertinent medical history  Marga does not get yeast infections when she takes antibiotics.   Marga does not need a return to work/school note.   Weight: 110 lbs   Marga smokes or uses smokeless tobacco.   She denies pregnancy and denies breastfeeding. She has menstruated in the past month.   Weight: 110 lbs  A synchronous phone visit was initiated by the provider for the following reason: Explain sob    MEDICATIONS: Seroquel oral, sertraline oral, ALLERGIES: Sulfa (Sulfonamide Antibiotics)  Clinician Response:  Dear Marga,  I am sorry you are not feeling well. Additional information was needed to clarify some of the details provided during your Visit. Because I was unable to reach you, I would like you to be seen in person to further discuss your health history and symptoms so you get the most appropriate care for you.  You will not be charged for this Visit. Thank you for trusting us with your care.  COVID-19 (Coronavirus) General Information  With the increase in the number of COVID-19 (Coronavirus) cases, we understand you may have some questions. Below is some helpful information on COVID-19  (Coronavirus).  How can I protect myself and others from the COVID-19 (Coronavirus)?  Because there is currently no vaccine to prevent infection, the best way to protect yourself is to avoid being exposed to this virus. Put distance between yourself and other people if COVID-19 (Coronavirus) is spreading in your community. The virus is thought to spread mainly from person-to-person.     Between people who are in close contact with one another (within about 6 about) for a prolonged period (10 minutes or longer).    Through respiratory droplets produced when an infected person coughs or sneezes.     The CDC recommends the following additional steps to protect yourself and others:     Wash your hands often with soap and water for at least 20 seconds, especially after blowing your nose, coughing, or sneezing; going to the bathroom; and before eating or preparing food.  Use an alcohol-based hand  that contains at least 60 percent alcohol if soap and water are not available.        Avoid touching your eyes, nose and mouth with unwashed hands.    Avoid close contact with people who are sick.    Stay home when you are sick.    Cover your cough or sneeze with a tissue, then throw the tissue in the trash.    Clean and disinfect frequently touched objects and surfaces.     You can help stop COVID-19 (Coronavirus) by knowing the signs and symptoms:     Fever    Cough    Shortness of breath     Contact your healthcare provider if   Develop symptoms   AND   Have been in close contact with a person known to have COVID-19 (Coronavirus) or live in or have recently traveled from an area with ongoing spread of COVID-19 (Coronavirus). Call ahead before you go to a doctor's office or emergency room. Tell them about your recent travel and your symptoms.   For the most up to date information, visit the CDC's website.  Self-monitoring  Self-monitoring means people should monitor themselves for fever by taking their temperatures  twice a day and remain alert for a cough or difficulty breathing.  It is important to check your health two times each day for 14 days after a potential exposure to a person with COVID-19 (Coronavirus) or after travel from a location where COVID-19 (Coronavirus) is widespread. If you have been exposed to a person with COVID-19 (Coronavirus), it may take up to 14 days to know if you will get sick. Follow the steps below to check and record your health.     Take your temperature with a thermometer twice a day, once in the morning and once in the evening, and watch for a cough or difficulty breathing for 14 days.    Write down your temperature and any COVID-19 symptoms you may have: feeling feverish, coughing, or difficulty breathing.    Stay home from work or school.    Do not take public transportation, taxis, or ride-shares.    Avoid crowded places (such as shopping centers and movie theaters) and limit your activities in public.    Keep your distance from others (about 6 feet or 2 meters).    If you get sick with fever, cough, or trouble breathing, contact your healthcare provider and tell them about your recent travel and/or your symptoms.    If you need to seek medical care for other reasons, such as dialysis, call ahead to your doctor and tell them about your recent travel.     Steps to help prevent the spread of COVID-19 (Coronavirus) if you are sick  If you are sick with COVID-19 (Coronavirus) or suspect you are infected with the virus that causes COVID-19 (Coronavirus), follow the steps below to help prevent the disease from spreading&nbsp;to people in your home and community.     Stay home except to get medical care. Home isolation may be started in consultation with your healthcare clinician.    Separate yourself from other people and animals in your home.    Call ahead before visiting your doctor if you have a medical appointment.    Wear a facemask when you are around other people.    Cover your cough and  "sneezes.    Clean your hands often.    Avoid sharing personal household items.    Clean and disinfect frequently touched objects and surfaces everyday.    You will need to have someone drop off medications or household supplies (if needed) at your house without coming inside or in contact with you or others living in your house.    Monitor your symptoms and seek prompt medical care if your illness is worsening (e.g. Difficulty breathing).    Discontinue home isolation only in consultation with your healthcare provider.     For more detailed and up to date information on what to do if you are sick, visit this link: What to Do If You Are Sick With COVID-19.  Do I need to be tested for COVID-19 (Coronavirus)?     Not everyone needs to be tested for COVID-19 (Coronavirus). Decisions on which patients receive testing will be based on the local spread of COVID-19 (Coronavirus) as well as the symptoms. Your healthcare provider will make the final decision on whether you should be tested.    In the meantime, if you have concerns that you may have been exposed, it is reasonable to practice \"social distancing.\"&nbsp; If you are ill with a cold or flu-like illness, please monitor your symptoms and call your healthcare provider if your symptoms worsen.    For more up to date information, visit this link: COVID-19 (Coronavirus) Frequently Asked Questions and Answers.      Diagnosis: Unable to contact patient  Diagnosis ICD: R69  Synchronous Triage: phone, status: incomplete, duration: 162 seconds  "

## 2020-03-27 ENCOUNTER — NURSE TRIAGE (OUTPATIENT)
Dept: NURSING | Facility: CLINIC | Age: 25
End: 2020-03-27

## 2020-03-27 NOTE — TELEPHONE ENCOUNTER
"Marga reports dry cough, constant chest pain rated 5/10 described as \"elephant sitting on my chest.\" Expectorated a large amount of dark green sputum. Has mild shortness of breath. No fever.    Per protocol, advised ED. Care advice reviewed. Patient verbalizes understanding, refused to head to ED and prefers to stay at home. Advised to call back with further questions/concerns.     Teresa Erwin RN/[facility] Nurse Advisor      Reason for Disposition    Chest pain  (Exception: MILD central chest pain, present only when coughing)    Additional Information    Negative: Severe difficulty breathing (e.g., struggling for each breath, speaks in single words)    Negative: Bluish (or gray) lips or face now    Negative: [1] Rapid onset of cough AND [2] has hives    Negative: Coughing started suddenly after medicine, an allergic food or bee sting    Negative: [1] Difficulty breathing AND [2] exposure to flames, smoke, or fumes    Negative: [1] Stridor AND [2] difficulty breathing    Negative: Sounds like a life-threatening emergency to the triager    Protocols used: COUGH - ACUTE NON-PRODUCTIVE-A-AH      "